# Patient Record
Sex: FEMALE | Race: OTHER | NOT HISPANIC OR LATINO
[De-identification: names, ages, dates, MRNs, and addresses within clinical notes are randomized per-mention and may not be internally consistent; named-entity substitution may affect disease eponyms.]

---

## 2017-11-15 ENCOUNTER — RESULT REVIEW (OUTPATIENT)
Age: 47
End: 2017-11-15

## 2017-11-15 ENCOUNTER — OUTPATIENT (OUTPATIENT)
Dept: OUTPATIENT SERVICES | Facility: HOSPITAL | Age: 47
LOS: 1 days | Discharge: HOME | End: 2017-11-15

## 2017-11-17 LAB — SURGICAL PATHOLOGY STUDY: SIGNIFICANT CHANGE UP

## 2022-07-01 PROBLEM — Z00.00 ENCOUNTER FOR PREVENTIVE HEALTH EXAMINATION: Status: ACTIVE | Noted: 2022-07-01

## 2022-07-05 ENCOUNTER — APPOINTMENT (OUTPATIENT)
Dept: UROLOGY | Facility: CLINIC | Age: 52
End: 2022-07-05

## 2022-07-05 VITALS
BODY MASS INDEX: 22.76 KG/M2 | OXYGEN SATURATION: 99 % | DIASTOLIC BLOOD PRESSURE: 96 MMHG | SYSTOLIC BLOOD PRESSURE: 143 MMHG | HEART RATE: 85 BPM | WEIGHT: 145 LBS | TEMPERATURE: 96.4 F | HEIGHT: 67 IN

## 2022-07-05 DIAGNOSIS — Z80.42 FAMILY HISTORY OF MALIGNANT NEOPLASM OF PROSTATE: ICD-10-CM

## 2022-07-05 DIAGNOSIS — Z78.9 OTHER SPECIFIED HEALTH STATUS: ICD-10-CM

## 2022-07-05 DIAGNOSIS — G58.8 OTHER SPECIFIED MONONEUROPATHIES: ICD-10-CM

## 2022-07-05 PROCEDURE — 99204 OFFICE O/P NEW MOD 45 MIN: CPT

## 2022-07-05 NOTE — PHYSICAL EXAM
[Urethral Meatus] : normal urethra [General Appearance - Well Developed] : well developed [General Appearance - Well Nourished] : well nourished [Normal Appearance] : normal appearance [Well Groomed] : well groomed [General Appearance - In No Acute Distress] : no acute distress [Respiration, Rhythm And Depth] : normal respiratory rhythm and effort [Exaggerated Use Of Accessory Muscles For Inspiration] : no accessory muscle use [Abdomen Soft] : soft [Abdomen Tenderness] : non-tender [] : no hepato-splenomegaly [Costovertebral Angle Tenderness] : no ~M costovertebral angle tenderness [Urinary Bladder Findings] : the bladder was normal on palpation [External Female Genitalia] : normal external genitalia [Vagina] : normal vaginal exam [Cervix] : normal cervix [Uterus] : uterus was normal size, without masses or tenderness [Normal Station and Gait] : the gait and station were normal for the patient's age [FreeTextEntry1] : - [facility], - CST, stage II cystocele, stage I rectocele, no apical prolapse noted.  [Oriented To Time, Place, And Person] : oriented to person, place, and time

## 2022-07-05 NOTE — LETTER BODY
[Dear  ___] : Dear  [unfilled], [Consult Letter:] : I had the pleasure of evaluating your patient, [unfilled]. [Please see my note below.] : Please see my note below. [Consult Closing:] : Thank you very much for allowing me to participate in the care of this patient.  If you have any questions, please do not hesitate to contact me. [Sincerely,] : Sincerely, [FreeTextEntry3] : Dean Paul MD\par System Director [facility]\par Department of Urology\par [facility] \par   at [facility]\par  of Urology\par [facility] [employer] at [facility]\par

## 2022-07-05 NOTE — HISTORY OF PRESENT ILLNESS
[FreeTextEntry1] : 51 year old  ( x 2) woman with history of pain on urination associated with the feeling of something pushing on her clitoris ? prolapse.  Denies any other irr/obs voiding symptoms. Her symptoms are exacerbated after intercourse. Post void residue in clinic today is almost 199 ml. \par \par

## 2022-07-07 LAB — BACTERIA UR CULT: NORMAL

## 2022-07-22 ENCOUNTER — APPOINTMENT (OUTPATIENT)
Dept: UROLOGY | Facility: CLINIC | Age: 52
End: 2022-07-22

## 2022-09-23 ENCOUNTER — APPOINTMENT (OUTPATIENT)
Dept: UROLOGY | Facility: CLINIC | Age: 52
End: 2022-09-23

## 2022-09-23 VITALS
SYSTOLIC BLOOD PRESSURE: 126 MMHG | TEMPERATURE: 97.3 F | DIASTOLIC BLOOD PRESSURE: 86 MMHG | HEART RATE: 78 BPM | OXYGEN SATURATION: 100 %

## 2022-09-23 DIAGNOSIS — N81.10 CYSTOCELE, UNSPECIFIED: ICD-10-CM

## 2022-09-23 DIAGNOSIS — R33.9 RETENTION OF URINE, UNSPECIFIED: ICD-10-CM

## 2022-09-23 PROCEDURE — 99215 OFFICE O/P EST HI 40 MIN: CPT | Mod: 25

## 2022-09-23 PROCEDURE — 51728 CYSTOMETROGRAM W/VP: CPT

## 2022-09-23 PROCEDURE — 51784 ANAL/URINARY MUSCLE STUDY: CPT

## 2022-09-23 PROCEDURE — 51797 INTRAABDOMINAL PRESSURE TEST: CPT

## 2022-09-23 PROCEDURE — 51741 ELECTRO-UROFLOWMETRY FIRST: CPT

## 2022-09-23 PROCEDURE — 51798 US URINE CAPACITY MEASURE: CPT

## 2022-09-24 PROBLEM — R33.9 INCOMPLETE BLADDER EMPTYING: Status: ACTIVE | Noted: 2022-07-05

## 2022-09-24 PROBLEM — N81.10 PROLAPSE OF VAGINAL WALL: Status: ACTIVE | Noted: 2022-07-05

## 2022-09-29 NOTE — ASSESSMENT
[FreeTextEntry1] : \par \par Impression/plan: 51 years old female patient with symptomatic stage II cystocele and incomplete bladder emptying. UDS reviewed. She has a large capacity bladder with incomplete bladder emptying despite pessary. We discussed options including PFE, pessary vs surgical corrections. All questions answered, will formalize plan of care once decision made. \par

## 2022-09-29 NOTE — HISTORY OF PRESENT ILLNESS
[FreeTextEntry1] : 51 year old  ( x 2) woman with history of pain on urination associated with the feeling of something pushing on her clitoris ? prolapse. Denies any other irr/obs voiding symptoms. Her symptoms are exacerbated after intercourse. Post void residue in clinic today is almost 199 ml. \par \par 22\par \par Patient is returned today for urodynamics, discussion of results, and review of plan of care for symptomatic POP.\par \par UDS - \par \par Filling/Storage Phase: First sensation 19 mL, First desire 319 mL, Normal desire 557 mL and Cystometric capacity 906 mL. Involuntary contractions were not present . Pt did not demonstrate stress urinary incontinence. Pt did not demonstrate urge urinary incontinence. Compliance: normal. EMG Activity: normal. \par \par Voiding Phase: Qmax 20.9 mL/s , Qmax at Pdet 0.8 mL/s, Pavg 28.3 cm/H20, Qavg 6.1 mL/s, voiding time 6:32 mm:sec and voided volume 383 mL. EMG activity was synergic. \par Additional Comments: voided 400 ml into the toilet in the bathroom.  ml. \par \par Urodynamic Interpretation : Increased bladder capacity. Patient experiencing no detrusor instability. The uroflow rate is normal. The uroflow pattern is normal. The detrusor contractility is normal. The patient has no bladder outlet obstruction. The patient has incomplete bladder emptying, a post void residual of 122 cc. The spincter activity is normal synergic. \par Additional Procedure Related Findings/Comments: size 4 ring with removed. \par